# Patient Record
Sex: MALE | Race: WHITE | NOT HISPANIC OR LATINO | Employment: FULL TIME | URBAN - METROPOLITAN AREA
[De-identification: names, ages, dates, MRNs, and addresses within clinical notes are randomized per-mention and may not be internally consistent; named-entity substitution may affect disease eponyms.]

---

## 2020-03-02 ENCOUNTER — OFFICE VISIT (OUTPATIENT)
Dept: URGENT CARE | Facility: CLINIC | Age: 60
End: 2020-03-02
Payer: COMMERCIAL

## 2020-03-02 VITALS
HEIGHT: 71 IN | WEIGHT: 175 LBS | TEMPERATURE: 99 F | BODY MASS INDEX: 24.5 KG/M2 | DIASTOLIC BLOOD PRESSURE: 89 MMHG | SYSTOLIC BLOOD PRESSURE: 151 MMHG | OXYGEN SATURATION: 96 % | RESPIRATION RATE: 18 BRPM | HEART RATE: 90 BPM

## 2020-03-02 DIAGNOSIS — J06.9 VIRAL URI: Primary | ICD-10-CM

## 2020-03-02 DIAGNOSIS — R50.9 FEVER, UNSPECIFIED FEVER CAUSE: ICD-10-CM

## 2020-03-02 LAB
CTP QC/QA: YES
CTP QC/QA: YES
FLUAV AG NPH QL: NEGATIVE
FLUBV AG NPH QL: NEGATIVE
S PYO RRNA THROAT QL PROBE: NEGATIVE

## 2020-03-02 PROCEDURE — 87804 INFLUENZA ASSAY W/OPTIC: CPT | Mod: QW,S$GLB,, | Performed by: FAMILY MEDICINE

## 2020-03-02 PROCEDURE — 99203 PR OFFICE/OUTPT VISIT, NEW, LEVL III, 30-44 MIN: ICD-10-PCS | Mod: 25,S$GLB,, | Performed by: FAMILY MEDICINE

## 2020-03-02 PROCEDURE — 87880 POCT RAPID STREP A: ICD-10-PCS | Mod: QW,S$GLB,, | Performed by: FAMILY MEDICINE

## 2020-03-02 PROCEDURE — 99203 OFFICE O/P NEW LOW 30 MIN: CPT | Mod: 25,S$GLB,, | Performed by: FAMILY MEDICINE

## 2020-03-02 PROCEDURE — 87880 STREP A ASSAY W/OPTIC: CPT | Mod: QW,S$GLB,, | Performed by: FAMILY MEDICINE

## 2020-03-02 PROCEDURE — 87804 POCT INFLUENZA A/B: ICD-10-PCS | Mod: 59,QW,S$GLB, | Performed by: FAMILY MEDICINE

## 2020-03-02 RX ORDER — LISINOPRIL AND HYDROCHLOROTHIAZIDE 12.5; 2 MG/1; MG/1
TABLET ORAL
COMMUNITY
Start: 2020-02-11

## 2020-03-02 RX ORDER — NEBIVOLOL HYDROCHLORIDE 5 MG/1
TABLET ORAL
COMMUNITY
Start: 2020-01-08

## 2020-03-02 RX ORDER — BENZONATATE 200 MG/1
200 CAPSULE ORAL 3 TIMES DAILY PRN
Qty: 30 CAPSULE | Refills: 0 | Status: SHIPPED | OUTPATIENT
Start: 2020-03-02

## 2020-03-02 RX ORDER — CELECOXIB 200 MG/1
CAPSULE ORAL
COMMUNITY
Start: 2020-01-31

## 2020-03-02 RX ORDER — GABAPENTIN 300 MG/1
CAPSULE ORAL
COMMUNITY
Start: 2020-01-31

## 2020-03-03 NOTE — PROGRESS NOTES
"Subjective:       Patient ID: Matt Rosenthal is a 59 y.o. male.    Vitals:  height is 5' 11" (1.803 m) and weight is 79.4 kg (175 lb). His tympanic temperature is 98.5 °F (36.9 °C). His blood pressure is 151/89 (abnormal) and his pulse is 90. His respiration is 18 and oxygen saturation is 96%.     Chief Complaint: Sore Throat    59-year-old male with 4 day history cough and hoarseness and aches.  Today began with sore throat and noticed temperature up to 100.8, despite scheduled Tylenol that he has taking for post knee replacement.  No history of asthma or reactive airways    Sore Throat    This is a new problem. The current episode started today. The problem has been unchanged. Associated symptoms include congestion and coughing. Pertinent negatives include no ear pain, shortness of breath, stridor or vomiting.       Constitution: Negative for chills, sweating, fatigue and fever.   HENT: Positive for congestion, sore throat and voice change. Negative for ear pain, sinus pain and sinus pressure.    Neck: Negative for painful lymph nodes.   Eyes: Negative for eye redness.   Respiratory: Positive for cough and sputum production. Negative for chest tightness, bloody sputum, COPD, shortness of breath, stridor, wheezing and asthma.    Gastrointestinal: Negative for nausea and vomiting.   Musculoskeletal: Negative for muscle ache.   Skin: Negative for rash.   Allergic/Immunologic: Negative for seasonal allergies and asthma.   Hematologic/Lymphatic: Negative for swollen lymph nodes.       Objective:      Physical Exam   Constitutional: He is oriented to person, place, and time. He appears well-developed and well-nourished. No distress.   Not ill-appearing   HENT:   Head: Normocephalic and atraumatic.   Right Ear: External ear normal.   Left Ear: External ear normal.   Mouth/Throat: No oropharyngeal exudate.   Mild erythema of pharynx.  No exudate.   Eyes: Pupils are equal, round, and reactive to light. Conjunctivae and EOM " are normal.   Neck: Normal range of motion.   Cardiovascular: Normal rate, regular rhythm and normal heart sounds.   Pulmonary/Chest: Effort normal and breath sounds normal. No stridor. No respiratory distress. He has no wheezes. He has no rales.   Lymphadenopathy:     He has no cervical adenopathy.   Neurological: He is alert and oriented to person, place, and time.   Skin: Skin is warm, dry and not diaphoretic.   Psychiatric: He has a normal mood and affect. His behavior is normal. Judgment and thought content normal.   Vitals reviewed.        Results for orders placed or performed in visit on 03/02/20   POCT Influenza A/B   Result Value Ref Range    Rapid Influenza A Ag Negative Negative    Rapid Influenza B Ag Negative Negative     Acceptable Yes    POCT rapid strep A   Result Value Ref Range    Rapid Strep A Screen Negative Negative     Acceptable Yes      Assessment:       1. Viral URI    2. Fever, unspecified fever cause        Plan:         Viral URI  -     benzonatate (TESSALON) 200 MG capsule; Take 1 capsule (200 mg total) by mouth 3 (three) times daily as needed for Cough.  Dispense: 30 capsule; Refill: 0    Fever, unspecified fever cause  -     POCT Influenza A/B  -     POCT rapid strep A    Make sure that you follow up with your primary care doctor in the next 2-5 days if needed .  Return to the Urgent Care if signs or symptoms change and certainly if you have worsening symptoms go to the nearest emergency department for further evaluation.

## 2020-03-03 NOTE — PATIENT INSTRUCTIONS
Viral Upper Respiratory Illness (Adult)  You have a viral upper respiratory illness (URI), which is another term for the common cold. This illness is contagious during the first few days. It is spread through the air by coughing and sneezing. It may also be spread by direct contact (touching the sick person and then touching your own eyes, nose, or mouth). Frequent handwashing will decrease risk of spread. Most viral illnesses go away within 7 to 10 days with rest and simple home remedies. Sometimes the illness may last for several weeks. Antibiotics will not kill a virus, and they are generally not prescribed for this condition.    Home care  · If symptoms are severe, rest at home for the first 2 to 3 days. When you resume activity, don't let yourself get too tired.  · Avoid being exposed to cigarette smoke (yours or others).  · You may use acetaminophen or ibuprofen to control pain and fever, unless another medicine was prescribed. (Note: If you have chronic liver or kidney disease, have ever had a stomach ulcer or gastrointestinal bleeding, or are taking blood-thinning medicines, talk with your healthcare provider before using these medicines.) Aspirin should never be given to anyone under 18 years of age who is ill with a viral infection or fever. It may cause severe liver or brain damage.  · Your appetite may be poor, so a light diet is fine. Avoid dehydration by drinking 6 to 8 glasses of fluids per day (water, soft drinks, juices, tea, or soup). Extra fluids will help loosen secretions in the nose and lungs.  · Over-the-counter cold medicines will not shorten the length of time youre sick, but they may be helpful for the following symptoms: cough, sore throat, and nasal and sinus congestion. (Note: Do not use decongestants if you have high blood pressure.)  Follow-up care  Follow up with your healthcare provider, or as advised.  When to seek medical advice  Call your healthcare provider right away if any  of these occur:  · Cough with lots of colored sputum (mucus)  · Severe headache; face, neck, or ear pain  · Difficulty swallowing due to throat pain  · Fever of 100.4°F (38°C)  Call 911, or get immediate medical care  Call emergency services right away if any of these occur:  · Chest pain, shortness of breath, wheezing, or difficulty breathing  · Coughing up blood  · Inability to swallow due to throat pain  Date Last Reviewed: 9/13/2015  © 8333-1684 [employer]. [address] [place] [address]. All rights reserved. This information is not intended as a substitute for professional medical care. Always follow your healthcare professional's instructions.      Make sure that you follow up with your primary care doctor in the next 2-5 days if needed .  Return to the Urgent Care if signs or symptoms change and certainly if you have worsening symptoms go to the nearest emergency department for further evaluation.

## 2025-01-13 ENCOUNTER — OFFICE VISIT (OUTPATIENT)
Dept: URGENT CARE | Facility: CLINIC | Age: 65
End: 2025-01-13
Payer: COMMERCIAL

## 2025-01-13 VITALS
WEIGHT: 175 LBS | RESPIRATION RATE: 18 BRPM | DIASTOLIC BLOOD PRESSURE: 91 MMHG | HEART RATE: 73 BPM | OXYGEN SATURATION: 95 % | BODY MASS INDEX: 24.5 KG/M2 | SYSTOLIC BLOOD PRESSURE: 155 MMHG | TEMPERATURE: 99 F | HEIGHT: 71 IN

## 2025-01-13 DIAGNOSIS — R52 BODY ACHES: ICD-10-CM

## 2025-01-13 DIAGNOSIS — R09.81 SINUS CONGESTION: ICD-10-CM

## 2025-01-13 DIAGNOSIS — R05.9 COUGH, UNSPECIFIED TYPE: Primary | ICD-10-CM

## 2025-01-13 DIAGNOSIS — J10.1 INFLUENZA A: ICD-10-CM

## 2025-01-13 DIAGNOSIS — J02.9 SORE THROAT: ICD-10-CM

## 2025-01-13 LAB
CTP QC/QA: YES
POC MOLECULAR INFLUENZA A AGN: POSITIVE
POC MOLECULAR INFLUENZA B AGN: NEGATIVE

## 2025-01-13 PROCEDURE — 87502 INFLUENZA DNA AMP PROBE: CPT | Mod: QW,S$GLB,, | Performed by: FAMILY MEDICINE

## 2025-01-13 PROCEDURE — 99214 OFFICE O/P EST MOD 30 MIN: CPT | Mod: S$GLB,,, | Performed by: FAMILY MEDICINE

## 2025-01-13 RX ORDER — OSELTAMIVIR PHOSPHATE 75 MG/1
75 CAPSULE ORAL 2 TIMES DAILY
Qty: 10 CAPSULE | Refills: 0 | Status: SHIPPED | OUTPATIENT
Start: 2025-01-13 | End: 2025-01-18

## 2025-01-13 RX ORDER — LISINOPRIL AND HYDROCHLOROTHIAZIDE 12.5; 2 MG/1; MG/1
1 TABLET ORAL DAILY
COMMUNITY
Start: 2024-04-08

## 2025-01-13 RX ORDER — NEBIVOLOL 5 MG/1
1 TABLET ORAL DAILY
COMMUNITY
Start: 2024-07-16

## 2025-01-13 RX ORDER — ATORVASTATIN CALCIUM 10 MG/1
10 TABLET, FILM COATED ORAL
COMMUNITY

## 2025-01-13 NOTE — PROGRESS NOTES
"Subjective:      Patient ID: Matt Rosenthal is a 64 y.o. male.    Vitals:  height is 5' 11" (1.803 m) and weight is 79.4 kg (175 lb). His oral temperature is 98.5 °F (36.9 °C). His blood pressure is 155/91 (abnormal) and his pulse is 73. His respiration is 18 and oxygen saturation is 95%.     Chief Complaint: Sinus Problem    Patient presents with c/o body aches, chills, cough, sore throat, running nose, sinus and chest congestion since yesterday. Pt denies fever, chills, CP, SOB, weakness/dizziness, N/V, diarrhea, abdominal pain, dysuria, loss of smell or taste.        Sinus Problem  This is a new problem. The current episode started yesterday. The problem is unchanged. Associated symptoms include congestion, coughing, a hoarse voice, sinus pressure and a sore throat. Treatments tried: NYQUIL,MUCINEX.       HENT:  Positive for congestion, sinus pressure and sore throat.    Respiratory:  Positive for cough.       Objective:     Physical Exam   Constitutional: He is oriented to person, place, and time. He appears well-developed. He is cooperative.  Non-toxic appearance. He does not appear ill. No distress.   HENT:   Head: Normocephalic and atraumatic.   Ears:   Right Ear: Hearing, tympanic membrane, external ear and ear canal normal. no impacted cerumen  Left Ear: Hearing, tympanic membrane, external ear and ear canal normal. no impacted cerumen  Nose: Congestion present. No mucosal edema, rhinorrhea or nasal deformity. No epistaxis. Right sinus exhibits no maxillary sinus tenderness and no frontal sinus tenderness. Left sinus exhibits no maxillary sinus tenderness and no frontal sinus tenderness.   Mouth/Throat: Uvula is midline, oropharynx is clear and moist and mucous membranes are normal. No trismus in the jaw. Normal dentition. No uvula swelling. No oropharyngeal exudate, posterior oropharyngeal edema or posterior oropharyngeal erythema.   Eyes: Conjunctivae and lids are normal. No scleral icterus.   Neck: " Trachea normal and phonation normal. Neck supple. No edema present. No erythema present. No neck rigidity present.   Cardiovascular: Normal rate, regular rhythm, normal heart sounds and normal pulses.   No murmur heard.  Pulmonary/Chest: Effort normal and breath sounds normal. No stridor. No respiratory distress. He has no decreased breath sounds. He has no wheezes. He has no rhonchi. He has no rales.   Abdominal: Normal appearance. He exhibits no distension. There is no abdominal tenderness. There is no left CVA tenderness and no right CVA tenderness.   Musculoskeletal: Normal range of motion.         General: No deformity. Normal range of motion.      Cervical back: He exhibits no tenderness.   Lymphadenopathy:     He has no cervical adenopathy.   Neurological: He is alert, oriented to person, place, and time and at baseline. He exhibits normal muscle tone. Coordination normal.   Skin: Skin is warm, dry, intact, not diaphoretic and not pale.   Psychiatric: His speech is normal and behavior is normal. Judgment and thought content normal.   Nursing note and vitals reviewed.      Assessment:     1. Cough, unspecified type    2. Sinus congestion    3. Sore throat    4. Body aches    5. Influenza A        Plan:   Discussed exam findings/results/diagnosis/plan with patient. Advised to f/u with PCP within 2-5 days. ER precautions given if symptoms get any worse. All questions answered. Patient verbally understood and agreed with treatment plan.  Educational materials and instructions regarding the visit diagnosis and management provided.     Cough, unspecified type  -     POCT Influenza A/B MOLECULAR    Sinus congestion    Sore throat    Body aches    Influenza A    Other orders  -     oseltamivir (TAMIFLU) 75 MG capsule; Take 1 capsule (75 mg total) by mouth 2 (two) times daily. for 5 days  Dispense: 10 capsule; Refill: 0

## 2025-03-24 ENCOUNTER — OFFICE VISIT (OUTPATIENT)
Dept: DERMATOLOGY | Facility: CLINIC | Age: 65
End: 2025-03-24
Payer: COMMERCIAL

## 2025-03-24 VITALS
SYSTOLIC BLOOD PRESSURE: 143 MMHG | BODY MASS INDEX: 24.51 KG/M2 | HEART RATE: 69 BPM | HEIGHT: 71 IN | DIASTOLIC BLOOD PRESSURE: 80 MMHG | WEIGHT: 175.06 LBS

## 2025-03-24 DIAGNOSIS — D03.61 MELANOMA IN SITU OF RIGHT UPPER ARM: Primary | ICD-10-CM

## 2025-03-24 PROCEDURE — 99999 PR PBB SHADOW E&M-EST. PATIENT-LVL IV: CPT | Mod: PBBFAC,,, | Performed by: DERMATOLOGY

## 2025-03-24 NOTE — PROGRESS NOTES
REFERRING PROVIDER:  Evan Hurtado M.D.    CHIEF COMPLAINT:  Established patient being seen for a chief complain of a skin lesion, located on the R lateral arm. The lesion has been present for over 25 years. He presents today for further evaluation and management. 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 Excision done by Dr. Wild and revealed adnexal colonization at margin.    HISTORY OF PRESENT ILLNESS:  64 y.o. male presents with a Melanoma in situ with over 25 years history of growth on the R lateral arm.    Negative for scabbing.  Negative for crusting.  Negative for bleeding.  Negative for itching.    Biopsy consistent with melanoma in situ.     Prior treatment with excision by Dr. Hurtado 7 days ago.    Pacemaker: No  Defibrillator: No  Artificial joints: Yes:  TKR, 2020  Artificial heart valves: No    PAST MEDICAL HISTORY:  Past Medical History:   Diagnosis Date    Hypertension        PAST SURGICAL HISTORY:  Past Surgical History:   Procedure Laterality Date    CLOSURE OF DEFECT OF MOHS PROCEDURE Left 04/01/2024    KNEE SURGERY  2020    TONSILLECTOMY      VASECTOMY      WISDOM TOOTH EXTRACTION          SOCIAL HISTORY:  Dependencies:  never smoked    PERTINENT MEDICATIONS:  See medications list.    ALLERGIES:  Patient has no known allergies.    ROS:  Skin: See HPI and No dry skin, injection site reaction, or itchy skin  Constitutional: No fatigue, fever, malaise, weight loss, or night sweats.  Cardiovascular: No chest pain, palpitations, or edema.  Respiratory: No coughing, wheezing, SOB, or sputum production.    PE:  Physical Exam    General: Mood and affect normal. Alert and orient X3. Normal appearance.  Head/Face: no suspicious lesions  Neck: no suspicious lesions  Abdomen: no suspicious lesions  RUE: erythematous scar measuring 3.2 x 0.2 cm on the R lateral arm.  No accentuation with woods lamp  LUE: no suspicious lesions    IMPRESSION:  Biopsy proven melanoma in  situ adnexal colonization at margin of excision.    PLAN: Consultation Excision    Location: R lateral arm  Indication: pathologist recommended complete removal    Review: An extensive history and exam was performed with attention to the lesion size, anatomic location, duration and the patient's overall medical status and co-morbidities.     Treatment Options: The treatment options for patient's skin lesion were reviewed with the patient in detail. These include local excision, possible topical therapy or no therapy. Given the indications and the features of the patient's skin lesion the patient has agreed with re-excisional surgery with 5 mm margins around the scar.    Risks and benefits: The rationale for re-excision was explained to the patient. The risks and benefits to therapy were discussed in detail. Specifically, the risks of infection, scarring, bleeding, dehiscence, hematoma, prolonged wound healing, incomplete removal, allergy to anesthesia, nerve injury, and recurrence were addressed. The treatment site was clearly identified and confirmed by the patient.    Follow-Up: Scheduled patient for re-excision on 4/8/25. After the appropriate surgical follow-up, the patient will be referred back to their referring physician for further care.         IMAGES      Tee Kraft MD

## 2025-03-24 NOTE — PROGRESS NOTES
REFERRING PROVIDER:  Evan Hurtado M.D.    CHIEF COMPLAINT:  Established patient being consulted for excisional surgery.    HISTORY OF PRESENT ILLNESS:  64 y.o. male presents with a melanoma in situ with over 25 years history of growth on the R arm.    Negative for scabbing.  Negative for crusting.  Negative for bleeding.  Negative for itching.    Biopsy consistent with melanoma in situ.     Prior treatment with excision 7 days ago by Dr. Hurtado.    Pacemaker: No  Defibrillator: No  Artificial joints: Yes: TKR, 2020  Artificial heart valves: No    PAST MEDICAL HISTORY:  Past Medical History:   Diagnosis Date    Hypertension        PAST SURGICAL HISTORY:  Past Surgical History:   Procedure Laterality Date    CLOSURE OF DEFECT OF MOHS PROCEDURE Left 04/01/2024    KNEE SURGERY      TONSILLECTOMY      VASECTOMY      WISDOM TOOTH EXTRACTION          SOCIAL HISTORY:  Dependencies:  never smoked    PERTINENT MEDICATIONS:  See medications list.    ALLERGIES:  Patient has no known allergies.    ROS:  Skin: See HPI and No dry skin, injection site reaction, or itchy skin  Constitutional: No fatigue, fever, malaise, weight loss, or night sweats.  Cardiovascular: No chest pain, palpitations, or edema.  Respiratory: No coughing, wheezing, SOB, or sputum production.    PE:  Physical Exam    General: Mood and affect normal. Alert and orient X3. Normal appearance.  Head/Face: no suspicious lesions  Neck: no suspicious lesions  Chest: no suspicious lesions  Abdomen: no suspicious lesions  RUE: erythematous R arm scar measuring 3.2 x 0.2 cm.   LUE: no suspicious lesions    IMPRESSION:  Biopsy proven melanoma in situ     PLAN:  The diagnosis and the pathology report were discussed in detail with the patient. Treatment options were reviewed, including wide-local excision. After careful review of patient's history and physical exam, and after discussion of treatment options, the decision was made  to perform re-excisional surgery  with 5 mm margins around the scar .    Scheduled patient for re-excision on 4/8/25. Risks, benefits, and alternatives were discussed with the patient. Discussed repair options including complex closure, skin flap, and skin graft with the patient. Pre-operative instructions provided to the patient.      IMAGES

## 2025-03-24 NOTE — PATIENT INSTRUCTIONS
"PREOPERATIVE INSTRUCTIONS       Dr. Kraft discussed a surgical excision with 5 mm  margins around the entire area of the biopsy. Your stitched wound may appear longer or bigger in length compared to the size before or after the biopsy. This length is necessary to close the surgical wound under the least amount of tension while still having a cosmetically elegant scar.     The excision will be performed under local anesthesia. Local anesthesia is just an injection in the localized area to numb the area being treated. It is normal to "feel" pressure but it should not be painful. We can administer more local during the procedure, if necessary.    You may eat a good breakfast or lunch prior to your arrival time. Take medications as normal. No alcohol 48 hours before or after surgery.    If you are taking a blood thinner (Aspirin, Coumadin, Plavix, Eliquis, Xarelto), you are NOT required to stop the medication prior to the procedure.     Please shower and shampoo before the procedure, as your wound and dressing site need to remain dry 24-48 hours after the surgery.       Please arrive on time so that our staff will have ample time for your preoperative preparation.       Children are not allowed in the procedure room. Unfortunately, we cannot provide childcare during clinic.        Do not apply makeup or hairspray if surgery is to be done on the face or scalp.          ** PLEASE ADVISE THE DOCTOR OR MEDICAL STAFF IF YOU HAVE A PACEMAKER OR DEFIBRILLATOR OR ANY OTHER IMPLANTED MEDICAL DEVICE. **       You may be asked to abstain from certain physical activities that will put any stretch or strain on the area, to not pop your stitches. Please plan accordingly; patients who exercise or perform physical activity at work (bending, lifting) may have to take it easy until your sutures have been removed. We can provide a note for your employer, if necessary.      With Dr. Kraft's repairs, stitches will be applied that you will " need to return to the clinic and have them removed 1-2 weeks later, depending on the location of the surgical site. Your surgical wound will require care following the procedure; instructions will be discussed, and you will be given a written copy following your surgery.     Please inform Dr. Kraft and/or medical staff if you have plans to travel. Dr. Kraft prefers patients not to fly on an airplane with stitches in place.       The greatest risk for postoperative bleeding is within the first 24-48 hours following your procedure.       Please call with any questions or concerns: [phone]. After hours, please call: [phone]       Please arrive for your surgery at: [address] [facility] [place] [address]       **When you arrive, please check in at Registration. Once you are checked in, please have a seat in the waiting area outside of [address] and we will be with you shortly after.

## 2025-04-08 ENCOUNTER — PROCEDURE VISIT (OUTPATIENT)
Dept: DERMATOLOGY | Facility: CLINIC | Age: 65
End: 2025-04-08
Payer: COMMERCIAL

## 2025-04-08 DIAGNOSIS — D03.61 MELANOMA IN SITU OF RIGHT UPPER ARM: Primary | ICD-10-CM

## 2025-04-08 NOTE — PATIENT INSTRUCTIONS
CARE OF WOUNDS WITH STITCHES     MATERIALS:  -hydrogen peroxide  -distilled water  -Q-tips  -Aquaphor Healing Ointment  -Telfa or similar non-stick/non-adhesive dressing pad  -Bandage tape (any bandage tape that works for you and your skin is okay to use)    -if interested in Cover Roll Stretch Bandage tape you may find it available at the following pharmacies: [facility] on [facility]; [facility] on [facility] [facility] on [facility] [place].      1. Keep the pressure dressing dry and in place for 24 hours. After 24 hours, you may resume your daily showers. You can shower with the dressing in place or remove it before showering. However, once the dressing gets wet it must be changed. PLEASE DO NOT LEAVE A WET DRESSING IN PLACE.  2. In a clean disposable cup, combine an even mixture of hydrogen peroxide and distilled water; use Q-tips to dip in the mixture and cleanse the incision line thoroughly. Be sure to remove any old or dried blood from around the incision line which could interfere with the healing process. Do not allow a thick crust or scab to form. Dry wound with Q-tip. Apply a thin film of Aquaphor Ointment over the incision line. Do this until the sutures are removed.   3. Use a piece of the non-stick/non-adherent dressing pad to cover the entire line of stitches. Then, use some bandage tape to cover the dressing pad completely, sealing air out on all sides of the wound.  4. Return to our clinic for your scheduled appointment to have your sutures removed. Sutures are normally removed 7-14 days after surgery depending on the location of treatment site.     IN CASE OF BLEEDING:  PLEASE DO NOT PANIC as this may increase bleeding.  Remove dressing, and clean the wound to see where bleeding is occurring and apply steady pressure with a clean cloth or gauze for 20 minutes (use a timer, if necessary). Let the timer run out before checking the area. If bleeding persists, repeat the 20  minutes of pressure. Ice or iced compresses may also be used. If these measures do not stop the bleeding, please call the office at [phone]. AFTER HOURS: please call [phone]. Do not take aspirin, aspirin containing medications, or Aleve, unless prescribed, for one week after surgery.     A small amount of redness is normal along any wound edge while it is healing. If, however, you experience intense pain at the site, increasing redness, discharge of pus, fever, or note a foul odor these are signs of infection and you should call the clinic at the above number.     REMINDER: Your dressing should be changed at least once a day. Use two extra strength Tylenol (Acetaminophen) and two Advil (Ibuprofen) together every six hours as you need to for mild pain. Do not drink alcoholic beverages while taking pain medication. No swimming or submerging the wound in bodies of water other than normal showering. Any questions about restrictions should be asked to medical staff and/or Dr. Kraft.

## 2025-04-09 ENCOUNTER — TELEPHONE (OUTPATIENT)
Dept: DERMATOLOGY | Facility: CLINIC | Age: 65
End: 2025-04-09
Payer: COMMERCIAL

## 2025-04-09 NOTE — TELEPHONE ENCOUNTER
Pt appt moved to 3:30pm on 4/22.    ----- Message from Marcia sent at 4/9/2025  3:24 PM CDT -----  Regarding: reschedule needed  Contact: pt  Type:  Reschedule ApptCaller is requesting to reschedule appointment.  Name of Caller: ptDate to Reschedule: 4/22/25 @ 3pmRescheduled To Date: 4/22/25 around 11 amReason for reschedule: conflict in scheduleWhen is the first available appointment? N/aSymptoms: post op visitBest Call Back Number: [phone] Information: just want to change the time of the appt

## 2025-04-10 NOTE — PROGRESS NOTES
PROCEDURE: Wide Local Excision with 0.5 cm margins with complex layered repair.     REFERRING PROVIDER: Evan Hurtado M.D.     ANESTHETIC: 7 cc 1% xylocaine with 1:100,000 epinephrine     SURGICAL PREP: Hibiclens, prepped by Breonna Ceja CST     SURGEON: Tee Kraft MD     ASSISTANTS:  Breonna Ceja CST     PREOPERATIVE DIAGNOSIS:  melanoma in situ       POSTOPERATIVE DIAGNOSIS: same as above     PATHOLOGIC DIAGNOSIS: Pending     LOCATION: R arm     INITIAL LESION SIZE: 3.3 x 0.1 cm    MARGINS: 0.5 cm    EXCISED DIAMETER: 4.3 x 1.1 cm       PREPARATION:  The diagnosis, procedure, alternatives, benefits and risks, including but not limited to: infection, bleeding/bruising, drug reactions, pain, scar or cosmetic defect, local sensation disturbances, wound dehiscence (separation of wound edges after sutures removed) and/or recurrence of present condition were explained to the patient. The patient elected to proceed, written consent signed.  Patient's identity was verified and the site was verified.     PROCEDURE:  The area of the R arm was prepped, draped, and anesthetized in the usual sterile fashion. Lesional tissue was carefully marked with 0.5 cm margins of clinically normal skin in all directions. An elliptical excision was fashioned from surrounding skin with a skin marker. The excision was made with a #15 blade carried down completely through the dermis to level of the subcutis, and the dissection was carried out in that plane. The wound was widely undermined to a distance of 1.9 cm; the width of the defect measured 1.1 cm. Extensive wide undermining was required to close the wound under minimal tension. Electrocoagulation was used to obtain hemostasis. Blood loss was minimal. The wound was then approximated in a layered fashion with subcutaneous and intradermal 4-0 Monocryl buried vertical mattress sutures and the wound was then superficially closed with simple running 4-0 Prolene sutures.     The  patient tolerated the procedure well.     The area was cleaned and dressed appropriately and the patient was given wound care instructions, as well as an appointment for follow-up evaluation and suture removal in 14 days.     LENGTH OF REPAIR: 5.9 cm      Orders Placed This Encounter    Specimen to Pathology, Dermatology    Suture at 12 o'clock superior and medial pole of excision.    Pre op      Post op

## 2025-04-14 ENCOUNTER — RESULTS FOLLOW-UP (OUTPATIENT)
Dept: DERMATOLOGY | Facility: CLINIC | Age: 65
End: 2025-04-14

## 2025-04-22 ENCOUNTER — OFFICE VISIT (OUTPATIENT)
Dept: DERMATOLOGY | Facility: CLINIC | Age: 65
End: 2025-04-22
Payer: COMMERCIAL

## 2025-04-22 DIAGNOSIS — Z48.817 AFTERCARE FOLLOWING SURGERY OF THE SKIN OR SUBCUTANEOUS TISSUE: Primary | ICD-10-CM

## 2025-04-22 PROCEDURE — 99212 OFFICE O/P EST SF 10 MIN: CPT | Mod: S$GLB,,, | Performed by: DERMATOLOGY

## 2025-04-22 PROCEDURE — 4010F ACE/ARB THERAPY RXD/TAKEN: CPT | Mod: CPTII,S$GLB,, | Performed by: DERMATOLOGY

## 2025-04-22 PROCEDURE — 99999 PR PBB SHADOW E&M-EST. PATIENT-LVL II: CPT | Mod: PBBFAC,,, | Performed by: DERMATOLOGY

## 2025-04-22 PROCEDURE — 1159F MED LIST DOCD IN RCRD: CPT | Mod: CPTII,S$GLB,, | Performed by: DERMATOLOGY

## 2025-04-22 PROCEDURE — G2211 COMPLEX E/M VISIT ADD ON: HCPCS | Mod: S$GLB,,, | Performed by: DERMATOLOGY

## 2025-04-22 NOTE — PROGRESS NOTES
64 y.o. male patient is here for suture removal following wide local excision to remove melanoma in situ on the R arm with complex layered repair 14 days ago.    Patient reports no problems.    MEDICATIONS:  See list.    Review of patient's allergies indicates:  No Known Allergies    Review of Systems   Constitutional:  Negative for fever and chills.   Gastrointestinal:  Negative for nausea, vomiting and diarrhea.       WOUND PE:   The R arm sutures intact. Wound healing well. Good approximation of skin edges. No signs or symptoms of infection.    IMPRESSION:  Healing operative site from wide local excision to remove a melanoma in situ on the R arm with complex layered repair 14 days ago.    PLAN:  Path results were discussed with pt in detail. Results show:   Skin, right arm, excision:   - RESIDUAL ATYPICAL JUNCTIONAL MELANOCYTIC HYPERPLASIA.  - MARGINS ARE NEGATIVE.  - PREVIOUS SITE CHANGES.  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   Pathology discussed with patient, Dr. Hurtado to follow incidental lesion clinically bx if changes noted.  Sutures removed today by Breonna Ceja, YEN. Steri-strips applied.  Apply Aquaphor twice daily for the next 2-3 months. Use SPF to protect the area.    RTC:  PRN or sooner if any concerns or problems with the treated area arise. Otherwise, follow up with Evan Hurtado M.D..